# Patient Record
Sex: MALE | Race: WHITE | Employment: FULL TIME | URBAN - METROPOLITAN AREA
[De-identification: names, ages, dates, MRNs, and addresses within clinical notes are randomized per-mention and may not be internally consistent; named-entity substitution may affect disease eponyms.]

---

## 2019-01-10 ENCOUNTER — HOSPITAL ENCOUNTER (OUTPATIENT)
Age: 29
Discharge: HOME | End: 2019-01-10
Payer: COMMERCIAL

## 2019-01-10 ENCOUNTER — HOSPITAL ENCOUNTER (OUTPATIENT)
Age: 29
Discharge: HOME | End: 2019-01-12
Payer: COMMERCIAL

## 2019-01-10 ENCOUNTER — HOSPITAL ENCOUNTER (OUTPATIENT)
Dept: GENERAL RADIOLOGY | Age: 29
Discharge: HOME | End: 2019-01-12
Payer: COMMERCIAL

## 2019-01-10 DIAGNOSIS — R07.89 OTHER CHEST PAIN: ICD-10-CM

## 2019-01-10 LAB
ABSOLUTE EOS #: 0.2 K/UL (ref 0–0.4)
ABSOLUTE IMMATURE GRANULOCYTE: NORMAL K/UL (ref 0–0.3)
ABSOLUTE LYMPH #: 1.8 K/UL (ref 1–4.8)
ABSOLUTE MONO #: 0.4 K/UL (ref 0.1–1.2)
ALBUMIN SERPL-MCNC: 4.9 G/DL (ref 3.5–5.2)
ALBUMIN/GLOBULIN RATIO: 1.7 (ref 1–2.5)
ALP BLD-CCNC: 50 U/L (ref 40–129)
ALT SERPL-CCNC: 35 U/L (ref 5–41)
ANION GAP SERPL CALCULATED.3IONS-SCNC: 12 MMOL/L (ref 9–17)
AST SERPL-CCNC: 15 U/L
BASOPHILS # BLD: 1 % (ref 0–2)
BASOPHILS ABSOLUTE: 0.1 K/UL (ref 0–0.2)
BILIRUB SERPL-MCNC: 0.34 MG/DL (ref 0.3–1.2)
BUN BLDV-MCNC: 19 MG/DL (ref 6–20)
BUN/CREAT BLD: NORMAL (ref 9–20)
CALCIUM SERPL-MCNC: 10.1 MG/DL (ref 8.6–10.4)
CHLORIDE BLD-SCNC: 104 MMOL/L (ref 98–107)
CO2: 27 MMOL/L (ref 20–31)
CREAT SERPL-MCNC: 0.97 MG/DL (ref 0.7–1.2)
D-DIMER QUANTITATIVE: 0.32 MG/L FEU
DIFFERENTIAL TYPE: NORMAL
EOSINOPHILS RELATIVE PERCENT: 3 % (ref 1–4)
GFR AFRICAN AMERICAN: >60 ML/MIN
GFR NON-AFRICAN AMERICAN: >60 ML/MIN
GFR SERPL CREATININE-BSD FRML MDRD: NORMAL ML/MIN/{1.73_M2}
GFR SERPL CREATININE-BSD FRML MDRD: NORMAL ML/MIN/{1.73_M2}
GLUCOSE BLD-MCNC: 89 MG/DL (ref 70–99)
HCT VFR BLD CALC: 42.9 % (ref 41–53)
HEMOGLOBIN: 14.4 G/DL (ref 13.5–17.5)
IMMATURE GRANULOCYTES: NORMAL %
LYMPHOCYTES # BLD: 28 % (ref 24–44)
MCH RBC QN AUTO: 29.2 PG (ref 26–34)
MCHC RBC AUTO-ENTMCNC: 33.6 G/DL (ref 31–37)
MCV RBC AUTO: 86.7 FL (ref 80–100)
MONOCYTES # BLD: 6 % (ref 2–11)
NRBC AUTOMATED: NORMAL PER 100 WBC
PDW BLD-RTO: 13.6 % (ref 12.5–15.4)
PLATELET # BLD: 278 K/UL (ref 140–450)
PLATELET ESTIMATE: NORMAL
PMV BLD AUTO: 7.5 FL (ref 6–12)
POTASSIUM SERPL-SCNC: 4.5 MMOL/L (ref 3.7–5.3)
RBC # BLD: 4.95 M/UL (ref 4.5–5.9)
RBC # BLD: NORMAL 10*6/UL
SEG NEUTROPHILS: 62 % (ref 36–66)
SEGMENTED NEUTROPHILS ABSOLUTE COUNT: 4.1 K/UL (ref 1.8–7.7)
SODIUM BLD-SCNC: 143 MMOL/L (ref 135–144)
TOTAL PROTEIN: 7.8 G/DL (ref 6.4–8.3)
TROPONIN INTERP: NORMAL
TROPONIN T: NORMAL NG/ML
TROPONIN, HIGH SENSITIVITY: <6 NG/L (ref 0–22)
TSH SERPL DL<=0.05 MIU/L-ACNC: 1.96 MIU/L (ref 0.3–5)
WBC # BLD: 6.6 K/UL (ref 3.5–11)
WBC # BLD: NORMAL 10*3/UL

## 2019-01-10 PROCEDURE — 85025 COMPLETE CBC W/AUTO DIFF WBC: CPT

## 2019-01-10 PROCEDURE — 85379 FIBRIN DEGRADATION QUANT: CPT

## 2019-01-10 PROCEDURE — 80053 COMPREHEN METABOLIC PANEL: CPT

## 2019-01-10 PROCEDURE — 84443 ASSAY THYROID STIM HORMONE: CPT

## 2019-01-10 PROCEDURE — 71046 X-RAY EXAM CHEST 2 VIEWS: CPT

## 2019-01-10 PROCEDURE — 36415 COLL VENOUS BLD VENIPUNCTURE: CPT

## 2019-01-10 PROCEDURE — 84484 ASSAY OF TROPONIN QUANT: CPT

## 2022-07-11 ENCOUNTER — OFFICE VISIT (OUTPATIENT)
Dept: FAMILY MEDICINE CLINIC | Age: 32
End: 2022-07-11
Payer: COMMERCIAL

## 2022-07-11 VITALS
HEART RATE: 75 BPM | TEMPERATURE: 99.5 F | DIASTOLIC BLOOD PRESSURE: 76 MMHG | BODY MASS INDEX: 27.35 KG/M2 | WEIGHT: 220 LBS | OXYGEN SATURATION: 98 % | HEIGHT: 75 IN | SYSTOLIC BLOOD PRESSURE: 118 MMHG

## 2022-07-11 DIAGNOSIS — F41.9 ANXIETY: ICD-10-CM

## 2022-07-11 DIAGNOSIS — Z76.89 ENCOUNTER TO ESTABLISH CARE: Primary | ICD-10-CM

## 2022-07-11 PROCEDURE — G8427 DOCREV CUR MEDS BY ELIG CLIN: HCPCS | Performed by: NURSE PRACTITIONER

## 2022-07-11 PROCEDURE — 1036F TOBACCO NON-USER: CPT | Performed by: NURSE PRACTITIONER

## 2022-07-11 PROCEDURE — G8419 CALC BMI OUT NRM PARAM NOF/U: HCPCS | Performed by: NURSE PRACTITIONER

## 2022-07-11 PROCEDURE — 99203 OFFICE O/P NEW LOW 30 MIN: CPT | Performed by: NURSE PRACTITIONER

## 2022-07-11 RX ORDER — BUPROPION HYDROCHLORIDE 150 MG/1
150 TABLET ORAL EVERY MORNING
Qty: 30 TABLET | Refills: 0 | Status: SHIPPED | OUTPATIENT
Start: 2022-07-11 | End: 2022-08-11 | Stop reason: ALTCHOICE

## 2022-07-11 SDOH — ECONOMIC STABILITY: FOOD INSECURITY: WITHIN THE PAST 12 MONTHS, YOU WORRIED THAT YOUR FOOD WOULD RUN OUT BEFORE YOU GOT MONEY TO BUY MORE.: NEVER TRUE

## 2022-07-11 SDOH — ECONOMIC STABILITY: FOOD INSECURITY: WITHIN THE PAST 12 MONTHS, THE FOOD YOU BOUGHT JUST DIDN'T LAST AND YOU DIDN'T HAVE MONEY TO GET MORE.: NEVER TRUE

## 2022-07-11 ASSESSMENT — ENCOUNTER SYMPTOMS
COUGH: 0
BACK PAIN: 0
DIARRHEA: 0
CONSTIPATION: 0
SORE THROAT: 0
VOMITING: 0
CHEST TIGHTNESS: 0
NAUSEA: 0
ABDOMINAL DISTENTION: 0
ABDOMINAL PAIN: 0
SHORTNESS OF BREATH: 0
RHINORRHEA: 0

## 2022-07-11 ASSESSMENT — PATIENT HEALTH QUESTIONNAIRE - PHQ9
SUM OF ALL RESPONSES TO PHQ QUESTIONS 1-9: 2
SUM OF ALL RESPONSES TO PHQ QUESTIONS 1-9: 2
SUM OF ALL RESPONSES TO PHQ9 QUESTIONS 1 & 2: 2
1. LITTLE INTEREST OR PLEASURE IN DOING THINGS: 1
SUM OF ALL RESPONSES TO PHQ QUESTIONS 1-9: 2
2. FEELING DOWN, DEPRESSED OR HOPELESS: 1
SUM OF ALL RESPONSES TO PHQ QUESTIONS 1-9: 2

## 2022-07-11 ASSESSMENT — ANXIETY QUESTIONNAIRES
1. FEELING NERVOUS, ANXIOUS, OR ON EDGE: 1
GAD7 TOTAL SCORE: 13
3. WORRYING TOO MUCH ABOUT DIFFERENT THINGS: 1
6. BECOMING EASILY ANNOYED OR IRRITABLE: 3
4. TROUBLE RELAXING: 2
7. FEELING AFRAID AS IF SOMETHING AWFUL MIGHT HAPPEN: 0
IF YOU CHECKED OFF ANY PROBLEMS ON THIS QUESTIONNAIRE, HOW DIFFICULT HAVE THESE PROBLEMS MADE IT FOR YOU TO DO YOUR WORK, TAKE CARE OF THINGS AT HOME, OR GET ALONG WITH OTHER PEOPLE: SOMEWHAT DIFFICULT
2. NOT BEING ABLE TO STOP OR CONTROL WORRYING: 3
5. BEING SO RESTLESS THAT IT IS HARD TO SIT STILL: 3

## 2022-07-11 ASSESSMENT — SOCIAL DETERMINANTS OF HEALTH (SDOH): HOW HARD IS IT FOR YOU TO PAY FOR THE VERY BASICS LIKE FOOD, HOUSING, MEDICAL CARE, AND HEATING?: NOT HARD AT ALL

## 2022-07-11 NOTE — PROGRESS NOTES
Luz Kruger, APRN-[facility]  [address] [facility]  [address] [address], Corine Womack  [address] [address]  Dept: [phone]  Dept Fax: [phone]    Patient ID: Ton Zamudio is a 28 y.o. male. HPI:  Ton Zamudio is a 28 y.o. male who presents to the office today for a first visit and to establish a relationship with a new primary care physician. Today, the patient complains of ongoing anxiety and being short tempered. He relates that the littlest things will set him off. He was, at one time, on Lexapro and relates that he didn't notice much benefit from it so he stopped it. Pt denies any fever or chills. Pt today denies any HA, chest pain, or SOB. Pt denies any N/V/D/C or abdominal pain. Patient denies any major health concerns. He does not take any prescription medications daily. Previous office notes, labs and diagnostic studies were reviewed prior to and during encounter. The patient's past medical, surgical, social, and family history as well as her current medications and allergies were reviewed as documented in today's encounter by JIM Reynolds.  Preventative Care:   o Colonoscopy: N/A   o PSA: N/A   o Smoking: Denies   o Alcohol: Socially  o Drug use: Denies      No current outpatient medications on file prior to visit. No current facility-administered medications on file prior to visit. SUBJECTIVE:     Review of Systems   Constitutional: Negative for activity change, fatigue and fever. HENT: Negative for congestion, ear pain, rhinorrhea and sore throat. Respiratory: Negative for cough, chest tightness and shortness of breath. Cardiovascular: Negative for chest pain and palpitations. Gastrointestinal: Negative for abdominal distention, abdominal pain, constipation, diarrhea, nausea and vomiting. Endocrine: Negative for polydipsia, polyphagia and polyuria. Genitourinary: Negative for difficulty urinating and dysuria. Musculoskeletal: Negative for arthralgias, back pain and myalgias. Skin: Negative for rash. Neurological: Negative for dizziness, weakness, light-headedness and headaches. Hematological: Negative for adenopathy. Psychiatric/Behavioral: Negative for agitation and behavioral problems. The patient is nervous/anxious. OBJECTIVE:  /76   Pulse 75   Temp 99.5 °F (37.5 °C)   Ht 6' 3\" (1.905 m)   Wt 220 lb (99.8 kg)   SpO2 98%   BMI 27.50 kg/m²     Physical Exam  Vitals and nursing note reviewed. Constitutional:       General: He is not in acute distress. Appearance: Normal appearance. He is well-developed. HENT:      Head: Normocephalic and atraumatic. Cardiovascular:      Rate and Rhythm: Normal rate and regular rhythm. Heart sounds: Normal heart sounds. No murmur heard. Pulmonary:      Effort: Pulmonary effort is normal. No respiratory distress. Breath sounds: Normal breath sounds. Chest:      Chest wall: No tenderness. Abdominal:      General: Bowel sounds are normal.      Palpations: Abdomen is soft. Tenderness: There is no abdominal tenderness. Musculoskeletal:         General: Normal range of motion. Cervical back: Normal range of motion. Right lower leg: No edema. Left lower leg: No edema. Skin:     General: Skin is warm and dry. Findings: No rash. Neurological:      Mental Status: He is alert and oriented to person, place, and time. Psychiatric:         Mood and Affect: Mood normal.         Behavior: Behavior is cooperative. ASSESSMENT:   Diagnosis Orders   1. Encounter to establish care     2. Anxiety       PLAN:  1. Encounter to establish care  - Previous patient of Dr. Krys Chaparro     2. Anxiety  - After lengthy discussion with patient regarding life events and feelings of nervousness, restlessness, and difficulty relaxing, together we opted for pharmacological intervention.    - Will start Wellbutrin 150 mg daily.   - Offered reassurance and allowed to ventilate feelings and ask questions. - Continue to monitor for triggers of increase anxiety  - Will have him follow up in a month  - Encouraged patient to express needs and concerns.   - Non-pharmological coping methods discussed. - Medications, labs, diagnostic studies, consultations and follow-up as documented in this encounter.    - On this date July 11, 2022,  I have spent greater than 50% of this visit reviewing previous notes, test results and/or face to face with the patient discussing the diagnoses, importance of compliance with the treatment plan, counseling, coordinating care as well as documenting on the day of the visit.      Seema Meehan, APRN-CNP

## 2022-08-11 ENCOUNTER — OFFICE VISIT (OUTPATIENT)
Dept: FAMILY MEDICINE CLINIC | Age: 32
End: 2022-08-11
Payer: COMMERCIAL

## 2022-08-11 VITALS
SYSTOLIC BLOOD PRESSURE: 108 MMHG | HEART RATE: 78 BPM | TEMPERATURE: 98.3 F | BODY MASS INDEX: 27.4 KG/M2 | OXYGEN SATURATION: 97 % | DIASTOLIC BLOOD PRESSURE: 64 MMHG | WEIGHT: 219.2 LBS

## 2022-08-11 DIAGNOSIS — F32.A ANXIETY AND DEPRESSION: Primary | ICD-10-CM

## 2022-08-11 DIAGNOSIS — F41.9 ANXIETY AND DEPRESSION: Primary | ICD-10-CM

## 2022-08-11 PROCEDURE — G8419 CALC BMI OUT NRM PARAM NOF/U: HCPCS | Performed by: NURSE PRACTITIONER

## 2022-08-11 PROCEDURE — 1036F TOBACCO NON-USER: CPT | Performed by: NURSE PRACTITIONER

## 2022-08-11 PROCEDURE — 99213 OFFICE O/P EST LOW 20 MIN: CPT | Performed by: NURSE PRACTITIONER

## 2022-08-11 PROCEDURE — G8427 DOCREV CUR MEDS BY ELIG CLIN: HCPCS | Performed by: NURSE PRACTITIONER

## 2022-08-11 RX ORDER — BUPROPION HYDROCHLORIDE 300 MG/1
300 TABLET ORAL EVERY MORNING
Qty: 90 TABLET | Refills: 1 | Status: SHIPPED | OUTPATIENT
Start: 2022-08-11 | End: 2022-11-09

## 2022-08-11 ASSESSMENT — ENCOUNTER SYMPTOMS
SHORTNESS OF BREATH: 0
CHEST TIGHTNESS: 0
ABDOMINAL DISTENTION: 0
RHINORRHEA: 0
SORE THROAT: 0
COUGH: 0
DIARRHEA: 0
VOMITING: 0
NAUSEA: 0
BACK PAIN: 0
ABDOMINAL PAIN: 0
CONSTIPATION: 0

## 2022-08-11 NOTE — PROGRESS NOTES
Thanh Covarrubias, APRN-CNP  [facility]  [id number] [address]  [address] [address]  Dept: [phone]  Dept Fax: [phone]     PATIENT ID: Chris Blanco is a 28 y.o. male. HPI:  Established pt here today for f/u on anxiety after being started on Wellbutrin. He relates that he has been taking the medication as prescribed without significant side effects. He relates that since starting the medication, his mood has slightly improved but does think that things could be a little better. Pt denies any fever or chills. Pt today denies any HA, chest pain, or SOB. Pt denies any N/V/D/C or abdominal pain. Today, patient is doing well on current tx and voices no concerns. My previous office notes, labs and diagnostic studies were reviewed prior to and during encounter. The patient's past medical, surgical, social, and family history as well as current medications and allergies were reviewed as documented in today's encounter by JIM Escobar. No current outpatient medications on file prior to visit. No current facility-administered medications on file prior to visit. SUBJECTIVE:     Review of Systems   Constitutional:  Negative for activity change, fatigue and fever. HENT:  Negative for congestion, ear pain, rhinorrhea and sore throat. Respiratory:  Negative for cough, chest tightness and shortness of breath. Cardiovascular:  Negative for chest pain and palpitations. Gastrointestinal:  Negative for abdominal distention, abdominal pain, constipation, diarrhea, nausea and vomiting. Endocrine: Negative for polydipsia, polyphagia and polyuria. Genitourinary:  Negative for difficulty urinating and dysuria. Musculoskeletal:  Negative for arthralgias, back pain and myalgias. Skin:  Negative for rash. Neurological:  Negative for dizziness, weakness, light-headedness and headaches.    Hematological:  Negative for adenopathy. Psychiatric/Behavioral:  Positive for dysphoric mood (improving on Wellbutrin). Negative for agitation and behavioral problems. The patient is nervous/anxious (improving on Wellbutrin). OBJECTIVE:  /64   Pulse 78   Temp 98.3 °F (36.8 °C)   Wt 219 lb 3.2 oz (99.4 kg)   SpO2 97%   BMI 27.40 kg/m²     Physical Exam  Vitals and nursing note reviewed. Constitutional:       General: He is not in acute distress. Appearance: Normal appearance. He is well-developed. HENT:      Head: Normocephalic and atraumatic. Cardiovascular:      Rate and Rhythm: Normal rate and regular rhythm. Heart sounds: Normal heart sounds. No murmur heard. Pulmonary:      Effort: Pulmonary effort is normal. No respiratory distress. Breath sounds: Normal breath sounds. Chest:      Chest wall: No tenderness. Abdominal:      General: Bowel sounds are normal.      Palpations: Abdomen is soft. Tenderness: There is no abdominal tenderness. Musculoskeletal:         General: Normal range of motion. Cervical back: Normal range of motion. Right lower leg: No edema. Left lower leg: No edema. Skin:     General: Skin is warm and dry. Findings: No rash. Neurological:      Mental Status: He is alert and oriented to person, place, and time. Psychiatric:         Mood and Affect: Mood normal.         Behavior: Behavior is cooperative. ASSESSMENT:   Diagnosis Orders   1. Anxiety and depression  buPROPion (WELLBUTRIN XL) 300 MG extended release tablet        PLAN:  1. Anxiety and depression  - Improving on Wellbutrin  - Will increase to 300 mg daily  - Pt encouraged to deep breath and relax through anxious moments   - Offered reassurance and allowed to ventilate feelings and ask questions.   - Non-pharmological coping methods discussed. - Rest of systems unchanged, continue current treatments.     - On this date August 11, 2022,  I have spent greater than 50% of this visit reviewing previous notes, test results and/or face to face with the patient discussing the diagnoses, importance of compliance with the treatment plan, counseling, coordinating care as well as documenting on the day of the visit.      Farrah Sánchez, APRN-CNP

## 2022-10-20 ENCOUNTER — TELEMEDICINE (OUTPATIENT)
Dept: FAMILY MEDICINE CLINIC | Age: 32
End: 2022-10-20
Payer: COMMERCIAL

## 2022-10-20 DIAGNOSIS — F32.A ANXIETY AND DEPRESSION: Primary | ICD-10-CM

## 2022-10-20 DIAGNOSIS — F41.9 ANXIETY AND DEPRESSION: Primary | ICD-10-CM

## 2022-10-20 PROCEDURE — G8427 DOCREV CUR MEDS BY ELIG CLIN: HCPCS | Performed by: NURSE PRACTITIONER

## 2022-10-20 PROCEDURE — 99213 OFFICE O/P EST LOW 20 MIN: CPT | Performed by: NURSE PRACTITIONER

## 2022-10-20 ASSESSMENT — ENCOUNTER SYMPTOMS
ABDOMINAL PAIN: 0
CONSTIPATION: 0
VOMITING: 0
COUGH: 0
NAUSEA: 0
DIARRHEA: 0
SHORTNESS OF BREATH: 0
BACK PAIN: 0
ABDOMINAL DISTENTION: 0
CHEST TIGHTNESS: 0
RHINORRHEA: 0
SORE THROAT: 0

## 2022-10-20 ASSESSMENT — PATIENT HEALTH QUESTIONNAIRE - PHQ9
1. LITTLE INTEREST OR PLEASURE IN DOING THINGS: 0
SUM OF ALL RESPONSES TO PHQ9 QUESTIONS 1 & 2: 0
2. FEELING DOWN, DEPRESSED OR HOPELESS: 0
SUM OF ALL RESPONSES TO PHQ QUESTIONS 1-9: 0

## 2022-10-20 NOTE — PROGRESS NOTES
Charmayne Riser, APRN-CNP  [facility]  [address] [address]  [address] [address]  Dept: [phone]  Dept Fax: [phone]     PATIENT ID: Kristy Jung is a 28 y.o. male. HPI:  Established patient presents via virtual visit today for f/u on chronic medical problems; anxiety and depression while being on Wellbutrin. He relates that he did stop taking the Wellbutrin about 6 weeks ago. He recently changed jobs and his stress level has significantly decreased. He relates that since being off of the Wellbutrin his mood is okay. He does express concern regarding worrying and stressing over things. For example, dishes in the sink or toys on the floor. He relates that it drives him nuts and he can't get over it. He relates then he will pick or get mad because of it. Pt denies any fever or chills. Pt today denies any HA, chest pain, or SOB. Pt denies any N/V/D/C or abdominal pain. Today, patient is doing well on current tx and voices no concerns. My previous office notes, labs and diagnostic studies were reviewed prior to and during encounter. The patient's past medical, surgical, social, and family history as well as his current medications and allergies were reviewed as documented in today's encounter by JIM Mckinney. Current Outpatient Medications on File Prior to Visit   Medication Sig Dispense Refill    buPROPion (WELLBUTRIN XL) 300 MG extended release tablet Take 1 tablet by mouth every morning 90 tablet 1     No current facility-administered medications on file prior to visit. SUBJECTIVE:     Review of Systems   Constitutional:  Negative for activity change, fatigue and fever. HENT:  Negative for congestion, ear pain, rhinorrhea and sore throat. Respiratory:  Negative for cough, chest tightness and shortness of breath. Cardiovascular:  Negative for chest pain and palpitations.    Gastrointestinal:  Negative for abdominal distention, abdominal pain, constipation, diarrhea, nausea and vomiting. Endocrine: Negative for polydipsia, polyphagia and polyuria. Genitourinary:  Negative for difficulty urinating and dysuria. Musculoskeletal:  Negative for arthralgias, back pain and myalgias. Skin:  Negative for rash. Neurological:  Negative for dizziness, weakness, light-headedness and headaches. Hematological:  Negative for adenopathy. Psychiatric/Behavioral:  Negative for agitation, behavioral problems and dysphoric mood. The patient is nervous/anxious (improved but stressing over little things, nit picking and getting mad over little things). OBJECTIVE:  There were no vitals taken during this encounter, as this was a virtual visit. Physical Exam  Vitals reviewed: Vital signs unavailable, as this is a virtual visit. Constitutional:       General: He is not in acute distress. Appearance: Normal appearance. He is not ill-appearing or toxic-appearing. Pulmonary:      Effort: No tachypnea or accessory muscle usage. Comments: Patient able to talk in full sentences without difficulty   Neurological:      General: No focal deficit present. Mental Status: He is alert and oriented to person, place, and time. Psychiatric:         Mood and Affect: Mood normal.         Speech: Speech normal.         Behavior: Behavior normal. Behavior is cooperative. ASSESSMENT:   Diagnosis Orders   1. Anxiety and depression  sertraline (ZOLOFT) 50 MG tablet        PLAN:  1. Anxiety and depression  - He did stop the Wellbutrin and is doing okay  - He continues to get upset and mad over \"simple things\"  - Will try low dose Zoloft 50 mg daily  - Will have him back in a month for evaluation  - Pt encouraged to deep breath and relax through anxious moments   - Offered reassurance and allowed to ventilate feelings and ask questions.   - Non-pharmological coping methods discussed.       - On this date October 20, 2022,  I have spent greater than 50% of this visit reviewing previous notes, test results and/or face to face with the patient discussing the diagnoses, importance of compliance with the treatment plan, counseling, coordinating care as well as documenting on the day of the visit. - Rosalba Singh, was evaluated through a synchronous (real-time) audio-video encounter. The patient (or guardian if applicable) is aware that this is a billable service, which includes applicable co-pays. This Virtual Visit was conducted with patient's (and/or legal guardian's) consent. The visit was conducted pursuant to the emergency declaration under the [facility], [address] [place] [address] authority and the [employer] Act. Patient identification was verified, and a caregiver was present when appropriate. The patient was located at Home: [address] [place] [address] [address]ngela Valladares [address]. Provider was located at [place] [address]): [facility]. 106, [facility] 86  [facility],  [facility] 36.. Total time spent for this encounter: Not billed by time    --YAN Trammell NP on 10/20/2022 at 4:37 PM    An electronic signature was used to authenticate this note.

## 2022-12-06 DIAGNOSIS — F41.9 ANXIETY AND DEPRESSION: ICD-10-CM

## 2022-12-06 DIAGNOSIS — F32.A ANXIETY AND DEPRESSION: ICD-10-CM

## 2023-03-13 ASSESSMENT — ENCOUNTER SYMPTOMS
RHINORRHEA: 0
COUGH: 0
NAUSEA: 0
BACK PAIN: 0
CONSTIPATION: 0
ABDOMINAL DISTENTION: 0
CHEST TIGHTNESS: 0
ABDOMINAL PAIN: 0
DIARRHEA: 0
VOMITING: 0
SHORTNESS OF BREATH: 0
SORE THROAT: 0

## 2023-03-13 NOTE — PROGRESS NOTES
Maria Elena Mandujano, APRN-CNP  [facility]  [address] [address]  [address] [address]  Dept: [phone]  Dept Fax: [phone]     PATIENT ID: Giovanna Hannah is a 28 y.o. male. HPI:  Established patient presenting via virtual visit today for an acute visit secondary to worsening mood. He relates that he had stopped taking the Zoloft about 2-21/2 months ago because he didn't think it was working. He relates that since stopping the medication, his mood is worse. He is getting easily frustrated and annoyed. Pt denies any fever or chills. Pt today denies any HA, chest pain, or SOB. Pt denies any N/V/D/C or abdominal pain. Otherwise pt doing well on current tx and voices no other concerns. My previous office notes, labs and diagnostic studies were reviewed prior to and during encounter. The patient's past medical, surgical, social, and family history as well as her current medications and allergies were reviewed as documented in today's encounter by JIM Skinner. No current outpatient medications on file prior to visit. No current facility-administered medications on file prior to visit. SUBJECTIVE:     Review of Systems   Constitutional:  Negative for activity change, fatigue and fever. HENT:  Negative for congestion, ear pain, rhinorrhea and sore throat. Respiratory:  Negative for cough, chest tightness and shortness of breath. Cardiovascular:  Negative for chest pain and palpitations. Gastrointestinal:  Negative for abdominal distention, abdominal pain, constipation, diarrhea, nausea and vomiting. Endocrine: Negative for polydipsia, polyphagia and polyuria. Genitourinary:  Negative for difficulty urinating and dysuria. Musculoskeletal:  Negative for arthralgias, back pain and myalgias. Skin:  Negative for rash. Neurological:  Negative for dizziness, weakness, light-headedness and headaches.    Hematological: Negative for adenopathy. Psychiatric/Behavioral:  Positive for dysphoric mood. Negative for agitation and behavioral problems. The patient is nervous/anxious. OBJECTIVE:  No vitals were taken during this encounter, as this is a virtual visit. Physical Exam  Vitals reviewed: Vital signs unavailable, as this is a virtual visit. Constitutional:       General: He is not in acute distress. Appearance: Normal appearance. He is not ill-appearing or toxic-appearing. Pulmonary:      Effort: No tachypnea or accessory muscle usage. Comments: Patient able to talk in full sentences without difficulty   Neurological:      General: No focal deficit present. Mental Status: He is alert and oriented to person, place, and time. Psychiatric:         Mood and Affect: Mood normal.         Speech: Speech normal.         Behavior: Behavior normal. Behavior is cooperative. ASSESSMENT:   Diagnosis Orders   1. Anxiety and depression  sertraline (ZOLOFT) 50 MG tablet        PLAN:  1. Anxiety and depression  - Was previously on Zoloft 50 mg daily; didn't think it was working so he stopped taking it  - We did discuss that he was started on a very low dosage and sometimes we do need to increase the dose. - He was tolerating it without significant side effects  - Will restart Zoloft but increase the dose to 75 mg daily  - He will keep me posted as to how he is doing  - We did discuss the possibility of having to increase to 100 mg daily but will see how he does with the 75 mg.     - Rest of systems unchanged, continue current treatments. - On this date March 14, 2023,  I have spent greater than 50% of this visit reviewing previous notes, test results and/or face to face with the patient discussing the diagnoses, importance of compliance with the treatment plan, counseling, coordinating care as well as documenting on the day of the visit.      - Earnest Spencer, was evaluated through a synchronous (real-time) audio-video encounter. The patient (or guardian if applicable) is aware that this is a billable service, which includes applicable co-pays. This Virtual Visit was conducted with patient's (and/or legal guardian's) consent. The visit was conducted pursuant to the emergency declaration under the [address] [place] [address], [address] [place] [address] authority and the [employer] and [facility] [facility] [employer] Act. Patient identification was verified, and a caregiver was present when appropriate. The patient was located at Home: [address] [place] [address]  Provider was located at [facility] ([address] [place], [place] [address]t): [address], [address] [place] [address],  [facility] 36.    Total time spent for this encounter: Not billed by time    --YAN Landis NP on 3/14/2023 at 9:45 AM    An electronic signature was used to authenticate this note.

## 2023-03-14 ENCOUNTER — TELEMEDICINE (OUTPATIENT)
Dept: FAMILY MEDICINE CLINIC | Age: 33
End: 2023-03-14
Payer: COMMERCIAL

## 2023-03-14 DIAGNOSIS — F41.9 ANXIETY AND DEPRESSION: Primary | ICD-10-CM

## 2023-03-14 DIAGNOSIS — F32.A ANXIETY AND DEPRESSION: Primary | ICD-10-CM

## 2023-03-14 PROCEDURE — 99213 OFFICE O/P EST LOW 20 MIN: CPT | Performed by: NURSE PRACTITIONER

## 2023-03-14 PROCEDURE — G8427 DOCREV CUR MEDS BY ELIG CLIN: HCPCS | Performed by: NURSE PRACTITIONER

## 2023-03-14 SDOH — ECONOMIC STABILITY: INCOME INSECURITY: HOW HARD IS IT FOR YOU TO PAY FOR THE VERY BASICS LIKE FOOD, HOUSING, MEDICAL CARE, AND HEATING?: NOT HARD AT ALL

## 2023-03-14 SDOH — ECONOMIC STABILITY: FOOD INSECURITY: WITHIN THE PAST 12 MONTHS, YOU WORRIED THAT YOUR FOOD WOULD RUN OUT BEFORE YOU GOT MONEY TO BUY MORE.: NEVER TRUE

## 2023-03-14 SDOH — ECONOMIC STABILITY: HOUSING INSECURITY
IN THE LAST 12 MONTHS, WAS THERE A TIME WHEN YOU DID NOT HAVE A STEADY PLACE TO SLEEP OR SLEPT IN A SHELTER (INCLUDING NOW)?: NO

## 2023-03-14 SDOH — ECONOMIC STABILITY: FOOD INSECURITY: WITHIN THE PAST 12 MONTHS, THE FOOD YOU BOUGHT JUST DIDN'T LAST AND YOU DIDN'T HAVE MONEY TO GET MORE.: NEVER TRUE

## 2023-03-14 ASSESSMENT — PATIENT HEALTH QUESTIONNAIRE - PHQ9
7. TROUBLE CONCENTRATING ON THINGS, SUCH AS READING THE NEWSPAPER OR WATCHING TELEVISION: 0
SUM OF ALL RESPONSES TO PHQ QUESTIONS 1-9: 3
9. THOUGHTS THAT YOU WOULD BE BETTER OFF DEAD, OR OF HURTING YOURSELF: 0
3. TROUBLE FALLING OR STAYING ASLEEP: 0
4. FEELING TIRED OR HAVING LITTLE ENERGY: 0
1. LITTLE INTEREST OR PLEASURE IN DOING THINGS: 0
SUM OF ALL RESPONSES TO PHQ QUESTIONS 1-9: 3
SUM OF ALL RESPONSES TO PHQ QUESTIONS 1-9: 3
6. FEELING BAD ABOUT YOURSELF - OR THAT YOU ARE A FAILURE OR HAVE LET YOURSELF OR YOUR FAMILY DOWN: 0
5. POOR APPETITE OR OVEREATING: 3
SUM OF ALL RESPONSES TO PHQ QUESTIONS 1-9: 3
2. FEELING DOWN, DEPRESSED OR HOPELESS: 0
10. IF YOU CHECKED OFF ANY PROBLEMS, HOW DIFFICULT HAVE THESE PROBLEMS MADE IT FOR YOU TO DO YOUR WORK, TAKE CARE OF THINGS AT HOME, OR GET ALONG WITH OTHER PEOPLE: 0
SUM OF ALL RESPONSES TO PHQ9 QUESTIONS 1 & 2: 0
8. MOVING OR SPEAKING SO SLOWLY THAT OTHER PEOPLE COULD HAVE NOTICED. OR THE OPPOSITE, BEING SO FIGETY OR RESTLESS THAT YOU HAVE BEEN MOVING AROUND A LOT MORE THAN USUAL: 0

## 2023-09-14 ENCOUNTER — TELEMEDICINE (OUTPATIENT)
Dept: FAMILY MEDICINE CLINIC | Age: 33
End: 2023-09-14
Payer: COMMERCIAL

## 2023-09-14 DIAGNOSIS — F41.9 ANXIETY: Primary | ICD-10-CM

## 2023-09-14 PROCEDURE — 99213 OFFICE O/P EST LOW 20 MIN: CPT | Performed by: NURSE PRACTITIONER

## 2023-09-14 PROCEDURE — G8427 DOCREV CUR MEDS BY ELIG CLIN: HCPCS | Performed by: NURSE PRACTITIONER

## 2023-09-14 RX ORDER — FLUOXETINE HYDROCHLORIDE 20 MG/1
20 CAPSULE ORAL DAILY
Qty: 30 CAPSULE | Refills: 0 | Status: SHIPPED | OUTPATIENT
Start: 2023-09-14 | End: 2023-10-14

## 2023-09-14 ASSESSMENT — PATIENT HEALTH QUESTIONNAIRE - PHQ9
SUM OF ALL RESPONSES TO PHQ QUESTIONS 1-9: 0
1. LITTLE INTEREST OR PLEASURE IN DOING THINGS: 0
SUM OF ALL RESPONSES TO PHQ QUESTIONS 1-9: 0
SUM OF ALL RESPONSES TO PHQ9 QUESTIONS 1 & 2: 0
2. FEELING DOWN, DEPRESSED OR HOPELESS: 0

## 2023-09-14 ASSESSMENT — ENCOUNTER SYMPTOMS
CONSTIPATION: 0
ABDOMINAL DISTENTION: 0
SHORTNESS OF BREATH: 0
ABDOMINAL PAIN: 0
DIARRHEA: 0
BACK PAIN: 0
NAUSEA: 0
SORE THROAT: 0
VOMITING: 0
COUGH: 0
CHEST TIGHTNESS: 0
RHINORRHEA: 0

## 2023-09-14 NOTE — PROGRESS NOTES
Carol Delacruz, APRN-CNP  [facility]  [address] [address], [address] [address]  Dept: [phone]  Dept Fax: [phone]     PATIENT ID: Rosa Pennington is a 35 y.o. male. HPI:  Established patient presenting via virtual visit today for an acute visit secondary to worsening anxiety and feelings of being short tempered. He was previously on Zoloft and thought that he needed to increase the dose. He knew if he increased the dose, he would run out of his prescription. He then thought that the medication wasn't working or doing much, so he stopped it. Since stopping the medication he has been more on edge and snapping. He thinks that he needs to go back on something but would like to try something different than the Zoloft. He has also been on Wellbutrin at one time and did not notice any improvement so it was stopped. Pt denies any fever or chills. Pt today denies any HA, chest pain, or SOB. Pt denies any N/V/D/C or abdominal pain. Otherwise pt doing well on current tx and voices no other concerns. My previous office notes, labs and diagnostic studies were reviewed prior to and during encounter. The patient's past medical, surgical, social, and family history as well as her current medications and allergies were reviewed as documented in today's encounter by JIM Bauer. Current Outpatient Medications on File Prior to Visit   Medication Sig Dispense Refill    sertraline (ZOLOFT) 50 MG tablet Take 1.5 tablets by mouth daily (Patient not taking: Reported on 9/14/2023) 135 tablet 1     No current facility-administered medications on file prior to visit. SUBJECTIVE:     Review of Systems   Constitutional:  Negative for activity change, fatigue and fever. HENT:  Negative for congestion, ear pain, rhinorrhea and sore throat. Respiratory:  Negative for cough, chest tightness and shortness of breath.     Cardiovascular:  Negative

## 2023-10-06 DIAGNOSIS — F41.9 ANXIETY: ICD-10-CM

## 2023-10-06 RX ORDER — FLUOXETINE HYDROCHLORIDE 20 MG/1
20 CAPSULE ORAL DAILY
Qty: 30 CAPSULE | Refills: 5 | Status: SHIPPED | OUTPATIENT
Start: 2023-10-06

## 2024-05-15 ENCOUNTER — TELEPHONE (OUTPATIENT)
Dept: FAMILY MEDICINE CLINIC | Age: 34
End: 2024-05-15

## 2024-05-15 DIAGNOSIS — F41.9 ANXIETY: ICD-10-CM

## 2024-05-15 RX ORDER — FLUOXETINE HYDROCHLORIDE 20 MG/1
40 CAPSULE ORAL DAILY
Qty: 30 CAPSULE | Refills: 5 | Status: SHIPPED | OUTPATIENT
Start: 2024-05-15

## 2024-05-15 NOTE — TELEPHONE ENCOUNTER
Patient is asking for the Prozac mediation to be changed to 40mg instead of 20mg.     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     Please advise.

## 2024-05-15 NOTE — TELEPHONE ENCOUNTER
He can take 2 of the 20 mg but please have him schedule an appointment as I have not seen in awhile.

## 2024-05-20 DIAGNOSIS — F41.9 ANXIETY: ICD-10-CM

## 2024-05-20 RX ORDER — FLUOXETINE HYDROCHLORIDE 20 MG/1
20 CAPSULE ORAL DAILY
Qty: 30 CAPSULE | Refills: 5 | OUTPATIENT
Start: 2024-05-20

## 2024-05-23 ENCOUNTER — OFFICE VISIT (OUTPATIENT)
Dept: FAMILY MEDICINE CLINIC | Age: 34
End: 2024-05-23
Payer: COMMERCIAL

## 2024-05-23 VITALS
SYSTOLIC BLOOD PRESSURE: 110 MMHG | TEMPERATURE: 98.1 F | OXYGEN SATURATION: 97 % | BODY MASS INDEX: 28.07 KG/M2 | HEIGHT: 75 IN | HEART RATE: 79 BPM | DIASTOLIC BLOOD PRESSURE: 66 MMHG | WEIGHT: 225.8 LBS

## 2024-05-23 DIAGNOSIS — F41.9 ANXIETY: ICD-10-CM

## 2024-05-23 DIAGNOSIS — Z00.00 PREVENTATIVE HEALTH CARE: ICD-10-CM

## 2024-05-23 DIAGNOSIS — Z00.00 ANNUAL PHYSICAL EXAM: Primary | ICD-10-CM

## 2024-05-23 PROCEDURE — 99395 PREV VISIT EST AGE 18-39: CPT | Performed by: NURSE PRACTITIONER

## 2024-05-23 RX ORDER — FLUOXETINE HYDROCHLORIDE 40 MG/1
40 CAPSULE ORAL DAILY
Qty: 90 CAPSULE | Refills: 3 | Status: SHIPPED | OUTPATIENT
Start: 2024-05-23

## 2024-05-23 SDOH — ECONOMIC STABILITY: INCOME INSECURITY: HOW HARD IS IT FOR YOU TO PAY FOR THE VERY BASICS LIKE FOOD, HOUSING, MEDICAL CARE, AND HEATING?: NOT HARD AT ALL

## 2024-05-23 SDOH — ECONOMIC STABILITY: FOOD INSECURITY: WITHIN THE PAST 12 MONTHS, YOU WORRIED THAT YOUR FOOD WOULD RUN OUT BEFORE YOU GOT MONEY TO BUY MORE.: NEVER TRUE

## 2024-05-23 SDOH — ECONOMIC STABILITY: FOOD INSECURITY: WITHIN THE PAST 12 MONTHS, THE FOOD YOU BOUGHT JUST DIDN'T LAST AND YOU DIDN'T HAVE MONEY TO GET MORE.: NEVER TRUE

## 2024-05-23 ASSESSMENT — PATIENT HEALTH QUESTIONNAIRE - PHQ9
5. POOR APPETITE OR OVEREATING: NOT AT ALL
9. THOUGHTS THAT YOU WOULD BE BETTER OFF DEAD, OR OF HURTING YOURSELF: NOT AT ALL
SUM OF ALL RESPONSES TO PHQ QUESTIONS 1-9: 0
1. LITTLE INTEREST OR PLEASURE IN DOING THINGS: NOT AT ALL
SUM OF ALL RESPONSES TO PHQ9 QUESTIONS 1 & 2: 0
7. TROUBLE CONCENTRATING ON THINGS, SUCH AS READING THE NEWSPAPER OR WATCHING TELEVISION: NOT AT ALL
10. IF YOU CHECKED OFF ANY PROBLEMS, HOW DIFFICULT HAVE THESE PROBLEMS MADE IT FOR YOU TO DO YOUR WORK, TAKE CARE OF THINGS AT HOME, OR GET ALONG WITH OTHER PEOPLE: NOT DIFFICULT AT ALL
6. FEELING BAD ABOUT YOURSELF - OR THAT YOU ARE A FAILURE OR HAVE LET YOURSELF OR YOUR FAMILY DOWN: NOT AT ALL
4. FEELING TIRED OR HAVING LITTLE ENERGY: NOT AT ALL
SUM OF ALL RESPONSES TO PHQ QUESTIONS 1-9: 0
8. MOVING OR SPEAKING SO SLOWLY THAT OTHER PEOPLE COULD HAVE NOTICED. OR THE OPPOSITE, BEING SO FIGETY OR RESTLESS THAT YOU HAVE BEEN MOVING AROUND A LOT MORE THAN USUAL: NOT AT ALL
2. FEELING DOWN, DEPRESSED OR HOPELESS: NOT AT ALL
3. TROUBLE FALLING OR STAYING ASLEEP: NOT AT ALL

## 2024-05-23 ASSESSMENT — ENCOUNTER SYMPTOMS
DIARRHEA: 0
NAUSEA: 0
VOMITING: 0
CONSTIPATION: 0
CHEST TIGHTNESS: 0
ABDOMINAL PAIN: 0
COUGH: 0
BACK PAIN: 0
ABDOMINAL DISTENTION: 0
RHINORRHEA: 0
SHORTNESS OF BREATH: 0
SORE THROAT: 0

## 2024-05-23 NOTE — PROGRESS NOTES
Lydia Roth, APRN-CNP  [facility]  [facility]  [address], [address]  [facility] [address]  Dept: [phone]  Dept Fax: [phone]     PATIENT ID: Ruben Lyon is a 34 y.o. male.    HPI:  Established presents today for annual physical exam. Today, patient complains of worsening anxiety. He has been taking Prozac 20 mg daily and it is not helping as much as it once did. He thinks that he would benefit from an increased dose of the medication.  Pt denies any fever or chills.  Pt today denies any HA, chest pain, or SOB.  Pt denies any N/V/D/C or abdominal pain.  Otherwise pt doing well on current tx and voices no other concerns today.      My previous office notes, labs and diagnostic studies were reviewed prior to and during encounter.  The patient's past medical, surgical, social, and family history as well as current medications and allergies were reviewed as documented in today's encounter by JIM Cruz.     No current outpatient medications on file prior to visit.     No current facility-administered medications on file prior to visit.     SUBJECTIVE:     Review of Systems   Constitutional:  Negative for activity change, fatigue and fever.   HENT:  Negative for congestion, ear pain, rhinorrhea and sore throat.    Respiratory:  Negative for cough, chest tightness and shortness of breath.    Cardiovascular:  Negative for chest pain and palpitations.   Gastrointestinal:  Negative for abdominal distention, abdominal pain, constipation, diarrhea, nausea and vomiting.   Endocrine: Negative for polydipsia, polyphagia and polyuria.   Genitourinary:  Negative for difficulty urinating and dysuria.   Musculoskeletal:  Negative for arthralgias, back pain and myalgias.   Skin:  Negative for rash.   Neurological:  Negative for dizziness, weakness, light-headedness and headaches.   Hematological:  Negative for adenopathy.   Psychiatric/Behavioral:  Negative

## 2025-01-23 ENCOUNTER — OFFICE VISIT (OUTPATIENT)
Dept: FAMILY MEDICINE CLINIC | Age: 35
End: 2025-01-23
Payer: COMMERCIAL

## 2025-01-23 VITALS
BODY MASS INDEX: 27.98 KG/M2 | DIASTOLIC BLOOD PRESSURE: 68 MMHG | TEMPERATURE: 98.2 F | HEART RATE: 73 BPM | WEIGHT: 225 LBS | OXYGEN SATURATION: 99 % | HEIGHT: 75 IN | RESPIRATION RATE: 16 BRPM | SYSTOLIC BLOOD PRESSURE: 116 MMHG

## 2025-01-23 DIAGNOSIS — F41.9 ANXIETY: Primary | ICD-10-CM

## 2025-01-23 DIAGNOSIS — F41.9 ANXIETY AND DEPRESSION: ICD-10-CM

## 2025-01-23 DIAGNOSIS — F32.A ANXIETY AND DEPRESSION: ICD-10-CM

## 2025-01-23 PROCEDURE — 99213 OFFICE O/P EST LOW 20 MIN: CPT | Performed by: NURSE PRACTITIONER

## 2025-01-23 PROCEDURE — G8419 CALC BMI OUT NRM PARAM NOF/U: HCPCS | Performed by: NURSE PRACTITIONER

## 2025-01-23 PROCEDURE — 1036F TOBACCO NON-USER: CPT | Performed by: NURSE PRACTITIONER

## 2025-01-23 PROCEDURE — G8427 DOCREV CUR MEDS BY ELIG CLIN: HCPCS | Performed by: NURSE PRACTITIONER

## 2025-01-23 SDOH — ECONOMIC STABILITY: FOOD INSECURITY: WITHIN THE PAST 12 MONTHS, YOU WORRIED THAT YOUR FOOD WOULD RUN OUT BEFORE YOU GOT MONEY TO BUY MORE.: NEVER TRUE

## 2025-01-23 SDOH — ECONOMIC STABILITY: FOOD INSECURITY: WITHIN THE PAST 12 MONTHS, THE FOOD YOU BOUGHT JUST DIDN'T LAST AND YOU DIDN'T HAVE MONEY TO GET MORE.: NEVER TRUE

## 2025-01-23 ASSESSMENT — ENCOUNTER SYMPTOMS
ABDOMINAL DISTENTION: 0
SHORTNESS OF BREATH: 0
SORE THROAT: 0
BACK PAIN: 0
RHINORRHEA: 0
COUGH: 0
CHEST TIGHTNESS: 0
DIARRHEA: 0
CONSTIPATION: 0
ABDOMINAL PAIN: 0
VOMITING: 0
NAUSEA: 0

## 2025-01-23 ASSESSMENT — PATIENT HEALTH QUESTIONNAIRE - PHQ9
10. IF YOU CHECKED OFF ANY PROBLEMS, HOW DIFFICULT HAVE THESE PROBLEMS MADE IT FOR YOU TO DO YOUR WORK, TAKE CARE OF THINGS AT HOME, OR GET ALONG WITH OTHER PEOPLE: NOT DIFFICULT AT ALL
4. FEELING TIRED OR HAVING LITTLE ENERGY: NOT AT ALL
1. LITTLE INTEREST OR PLEASURE IN DOING THINGS: NOT AT ALL
SUM OF ALL RESPONSES TO PHQ QUESTIONS 1-9: 1
7. TROUBLE CONCENTRATING ON THINGS, SUCH AS READING THE NEWSPAPER OR WATCHING TELEVISION: NOT AT ALL
SUM OF ALL RESPONSES TO PHQ QUESTIONS 1-9: 1
SUM OF ALL RESPONSES TO PHQ QUESTIONS 1-9: 1
SUM OF ALL RESPONSES TO PHQ9 QUESTIONS 1 & 2: 1
9. THOUGHTS THAT YOU WOULD BE BETTER OFF DEAD, OR OF HURTING YOURSELF: NOT AT ALL
SUM OF ALL RESPONSES TO PHQ QUESTIONS 1-9: 1
8. MOVING OR SPEAKING SO SLOWLY THAT OTHER PEOPLE COULD HAVE NOTICED. OR THE OPPOSITE, BEING SO FIGETY OR RESTLESS THAT YOU HAVE BEEN MOVING AROUND A LOT MORE THAN USUAL: NOT AT ALL
2. FEELING DOWN, DEPRESSED OR HOPELESS: SEVERAL DAYS
6. FEELING BAD ABOUT YOURSELF - OR THAT YOU ARE A FAILURE OR HAVE LET YOURSELF OR YOUR FAMILY DOWN: NOT AT ALL
3. TROUBLE FALLING OR STAYING ASLEEP: NOT AT ALL
5. POOR APPETITE OR OVEREATING: NOT AT ALL

## 2025-01-23 NOTE — PROGRESS NOTES
Lydia Roth, APRN-CNP  [facility]  [facility] MEDICINE  [address], [address]  [facility] [address]  Dept: [phone]  Dept Fax: [phone]     PATIENT ID: Ruben Lyon is a 34 y.o. male.    HPI:  Established pt here today for f/u on anxiety and depression. Today, he relates that he has stopped taking Prozac. He relates that he didn't think that it was working very well. He continues to struggle with his mood. He relates that there are things in his life that are triggers. He uses the term \"umbrella\" that is over him. He is unable to change the \"umbrella\" or remove the \"umbrella\". He has been on Zoloft, Wellbutrin and Prozac in the past, all which he stopped on his own because he didn't think they were helping. He also reports being on Lexapro at one time but that didn't help either. Pt denies any fever or chills.  Pt today denies any HA, chest pain, or SOB.  Pt denies any N/V/D/C or abdominal pain.  Today, patient denies complaints.  Patient is taking medications as prescribed and is tolerating them well without significant side effects. Patient is doing well on current treatment.     My previous office notes, labs and diagnostic studies were reviewed prior to and during encounter.  The patient's past medical, surgical, social, and family history as well as current medications and allergies were reviewed as documented in today's encounter by JIM Montejo.     Current Outpatient Medications on File Prior to Visit   Medication Sig Dispense Refill    FLUoxetine (PROZAC) 40 MG capsule Take 1 capsule by mouth daily (Patient not taking: Reported on 1/23/2025) 90 capsule 3     No current facility-administered medications on file prior to visit.     SUBJECTIVE:     Review of Systems   Constitutional:  Negative for activity change, fatigue and fever.   HENT:  Negative for congestion, ear pain, rhinorrhea and sore throat.    Respiratory:  Negative for cough, chest

## 2025-02-17 ENCOUNTER — TELEPHONE (OUTPATIENT)
Dept: FAMILY MEDICINE CLINIC | Age: 35
End: 2025-02-17

## 2025-02-17 DIAGNOSIS — F32.A ANXIETY AND DEPRESSION: ICD-10-CM

## 2025-02-17 DIAGNOSIS — F41.9 ANXIETY AND DEPRESSION: ICD-10-CM

## 2025-02-17 DIAGNOSIS — F41.9 ANXIETY: ICD-10-CM

## 2025-02-17 NOTE — TELEPHONE ENCOUNTER
Called pt and LVM informing him that we received [employer] testing and asked if he would like to move follow up appt up to start something sooner. Advised he call back.

## 2025-03-06 ENCOUNTER — TELEMEDICINE (OUTPATIENT)
Dept: FAMILY MEDICINE CLINIC | Age: 35
End: 2025-03-06
Payer: COMMERCIAL

## 2025-03-06 DIAGNOSIS — F41.9 ANXIETY: Primary | ICD-10-CM

## 2025-03-06 PROCEDURE — G8427 DOCREV CUR MEDS BY ELIG CLIN: HCPCS | Performed by: NURSE PRACTITIONER

## 2025-03-06 PROCEDURE — 99213 OFFICE O/P EST LOW 20 MIN: CPT | Performed by: NURSE PRACTITIONER

## 2025-03-06 RX ORDER — VILAZODONE HYDROCHLORIDE 10 MG/1
10 TABLET ORAL DAILY
Qty: 30 TABLET | Refills: 0 | Status: SHIPPED | OUTPATIENT
Start: 2025-03-06 | End: 2025-04-05

## 2025-03-06 ASSESSMENT — ENCOUNTER SYMPTOMS
SORE THROAT: 0
DIARRHEA: 0
ABDOMINAL DISTENTION: 0
SHORTNESS OF BREATH: 0
RHINORRHEA: 0
NAUSEA: 0
CONSTIPATION: 0
BACK PAIN: 0
COUGH: 0
CHEST TIGHTNESS: 0
ABDOMINAL PAIN: 0
VOMITING: 0

## 2025-03-06 NOTE — PROGRESS NOTES
Lydia Roth, APRN-CNP  [facility]  [facility] MEDICINE  [address], [address]  [facility] [address]  Dept: [phone]  Dept Fax: [phone]     PATIENT ID: Ruben Lyon is a 34 y.o. male.    HPI:  Established patient presenting via virtual visit today for an acute visit secondary to ongoing anxiety and depression. He has been on a couple different medications in the past (Zoloft, Lexapro, Wellbutrin & Prozac), none which have helped. We did do [employer] testing and all of those medications were listed a moderate to significant gene-drug interaction.  Pt denies any fever or chills.  Pt today denies any HA, chest pain, or SOB.  Pt denies any N/V/D/C or abdominal pain. Otherwise pt doing well on current tx and voices no other concerns.     My previous office notes, labs and diagnostic studies were reviewed prior to and during encounter.  The patient's past medical, surgical, social, and family history as well as her current medications and allergies were reviewed as documented in today's encounter by JIM Montejo.    Current Outpatient Medications on File Prior to Visit   Medication Sig Dispense Refill    FLUoxetine (PROZAC) 40 MG capsule Take 1 capsule by mouth daily (Patient not taking: Reported on 1/23/2025) 90 capsule 3     No current facility-administered medications on file prior to visit.     SUBJECTIVE:     Review of Systems   Constitutional:  Negative for activity change, fatigue and fever.   HENT:  Negative for congestion, ear pain, rhinorrhea and sore throat.    Respiratory:  Negative for cough, chest tightness and shortness of breath.    Cardiovascular:  Negative for chest pain and palpitations.   Gastrointestinal:  Negative for abdominal distention, abdominal pain, constipation, diarrhea, nausea and vomiting.   Endocrine: Negative for polydipsia, polyphagia and polyuria.   Genitourinary:  Negative for difficulty urinating and dysuria.

## 2025-04-15 ENCOUNTER — TELEPHONE (OUTPATIENT)
Dept: FAMILY MEDICINE CLINIC | Age: 35
End: 2025-04-15

## 2025-04-15 DIAGNOSIS — F41.9 ANXIETY: ICD-10-CM

## 2025-04-15 RX ORDER — VILAZODONE HYDROCHLORIDE 10 MG/1
10 TABLET ORAL DAILY
Qty: 30 TABLET | Refills: 0 | Status: CANCELLED | OUTPATIENT
Start: 2025-04-15 | End: 2025-05-15

## 2025-04-15 RX ORDER — VILAZODONE HYDROCHLORIDE 20 MG/1
20 TABLET ORAL DAILY
Qty: 90 TABLET | Refills: 1 | Status: SHIPPED | OUTPATIENT
Start: 2025-04-15 | End: 2025-07-14

## 2025-04-15 NOTE — TELEPHONE ENCOUNTER
Patient called in stating that he is needing a refill on a medication but is unsure if PCP would like to keep him on this medication or not.  Patient states the medication didn't really do anything for him.  He is wanting to know if she would like to up the dosage on the medication or just switch it all together.  Please advise.    vilazodone hcl 10 MG TABS     Preferred Pharmacy:     [facility] [address] [phone]  F [phone] [[id number]]